# Patient Record
Sex: FEMALE | Race: WHITE | NOT HISPANIC OR LATINO | Employment: OTHER | URBAN - METROPOLITAN AREA
[De-identification: names, ages, dates, MRNs, and addresses within clinical notes are randomized per-mention and may not be internally consistent; named-entity substitution may affect disease eponyms.]

---

## 2017-08-16 ENCOUNTER — HOSPITAL ENCOUNTER (OUTPATIENT)
Dept: CARDIOLOGY | Facility: HOSPITAL | Age: 47
Discharge: HOME | End: 2017-08-16
Attending: INTERNAL MEDICINE | Admitting: INTERNAL MEDICINE

## 2017-08-16 ENCOUNTER — OFFICE VISIT (OUTPATIENT)
Dept: CARDIOLOGY | Facility: CLINIC | Age: 47
End: 2017-08-16

## 2017-08-16 VITALS
HEIGHT: 67 IN | WEIGHT: 162 LBS | HEART RATE: 58 BPM | DIASTOLIC BLOOD PRESSURE: 80 MMHG | SYSTOLIC BLOOD PRESSURE: 136 MMHG | BODY MASS INDEX: 25.43 KG/M2

## 2017-08-16 DIAGNOSIS — R07.1 CHEST PAIN ON BREATHING: ICD-10-CM

## 2017-08-16 DIAGNOSIS — I30.0 PERICARDITIS, ACUTE, IDIOPATHIC: Primary | ICD-10-CM

## 2017-08-16 LAB
ASCENDING AORTA: 2.6 CM
BH CV ECHO MEAS - ACS: 2.3 CM
BH CV ECHO MEAS - AO MAX PG (FULL): 0 MMHG
BH CV ECHO MEAS - AO MAX PG: 4.4 MMHG
BH CV ECHO MEAS - AO MEAN PG (FULL): 0.03 MMHG
BH CV ECHO MEAS - AO MEAN PG: 2.4 MMHG
BH CV ECHO MEAS - AO ROOT AREA (BSA CORRECTED): 1.7
BH CV ECHO MEAS - AO ROOT AREA: 8.2 CM^2
BH CV ECHO MEAS - AO ROOT DIAM: 3.2 CM
BH CV ECHO MEAS - AO V2 MAX: 104.7 CM/SEC
BH CV ECHO MEAS - AO V2 MEAN: 71.8 CM/SEC
BH CV ECHO MEAS - AO V2 VTI: 24 CM
BH CV ECHO MEAS - AVA(I,A): 3.4 CM^2
BH CV ECHO MEAS - AVA(I,D): 3.4 CM^2
BH CV ECHO MEAS - AVA(V,A): 3.4 CM^2
BH CV ECHO MEAS - AVA(V,D): 3.4 CM^2
BH CV ECHO MEAS - BSA(HAYCOCK): 1.9 M^2
BH CV ECHO MEAS - BSA: 1.8 M^2
BH CV ECHO MEAS - BZI_BMI: 25.4 KILOGRAMS/M^2
BH CV ECHO MEAS - BZI_METRIC_HEIGHT: 170.2 CM
BH CV ECHO MEAS - BZI_METRIC_WEIGHT: 73.5 KG
BH CV ECHO MEAS - CONTRAST EF (2CH): 56.5 ML/M^2
BH CV ECHO MEAS - CONTRAST EF 4CH: 65.3 ML/M^2
BH CV ECHO MEAS - EDV(CUBED): 116.1 ML
BH CV ECHO MEAS - EDV(MOD-SP2): 85 ML
BH CV ECHO MEAS - EDV(MOD-SP4): 95 ML
BH CV ECHO MEAS - EDV(TEICH): 111.6 ML
BH CV ECHO MEAS - EF(CUBED): 86 %
BH CV ECHO MEAS - EF(MOD-SP2): 56.5 %
BH CV ECHO MEAS - EF(MOD-SP4): 65.3 %
BH CV ECHO MEAS - EF(TEICH): 79.3 %
BH CV ECHO MEAS - ESV(MOD-SP2): 37 ML
BH CV ECHO MEAS - ESV(MOD-SP4): 33 ML
BH CV ECHO MEAS - ESV(TEICH): 23.1 ML
BH CV ECHO MEAS - FS: 48 %
BH CV ECHO MEAS - IVS/LVPW: 1.1
BH CV ECHO MEAS - IVSD: 0.92 CM
BH CV ECHO MEAS - LAT PEAK E' VEL: 16 CM/SEC
BH CV ECHO MEAS - LV DIASTOLIC VOL/BSA (35-75): 51.4 ML/M^2
BH CV ECHO MEAS - LV MASS(C)D: 148.2 GRAMS
BH CV ECHO MEAS - LV MASS(C)DI: 80.1 GRAMS/M^2
BH CV ECHO MEAS - LV MAX PG: 4.4 MMHG
BH CV ECHO MEAS - LV MEAN PG: 2.3 MMHG
BH CV ECHO MEAS - LV SYSTOLIC VOL/BSA (12-30): 17.8 ML/M^2
BH CV ECHO MEAS - LV V1 MAX: 104.8 CM/SEC
BH CV ECHO MEAS - LV V1 MEAN: 72.7 CM/SEC
BH CV ECHO MEAS - LV V1 VTI: 24.1 CM
BH CV ECHO MEAS - LVIDD: 4.9 CM
BH CV ECHO MEAS - LVIDS: 2.5 CM
BH CV ECHO MEAS - LVLD AP2: 7.7 CM
BH CV ECHO MEAS - LVLD AP4: 7.6 CM
BH CV ECHO MEAS - LVLS AP2: 6.6 CM
BH CV ECHO MEAS - LVLS AP4: 6.3 CM
BH CV ECHO MEAS - LVOT AREA (M): 3.5 CM^2
BH CV ECHO MEAS - LVOT AREA: 3.4 CM^2
BH CV ECHO MEAS - LVOT DIAM: 2.1 CM
BH CV ECHO MEAS - LVPWD: 0.85 CM
BH CV ECHO MEAS - MED PEAK E' VEL: 11 CM/SEC
BH CV ECHO MEAS - MV A MAX VEL: 42.2 CM/SEC
BH CV ECHO MEAS - MV E MAX VEL: 79.5 CM/SEC
BH CV ECHO MEAS - MV E/A: 1.9
BH CV ECHO MEAS - MV MAX PG: 3.5 MMHG
BH CV ECHO MEAS - MV MEAN PG: 1.1 MMHG
BH CV ECHO MEAS - MV V2 MAX: 93.9 CM/SEC
BH CV ECHO MEAS - MV V2 MEAN: 47.6 CM/SEC
BH CV ECHO MEAS - MV V2 VTI: 29 CM
BH CV ECHO MEAS - MVA(VTI): 2.8 CM^2
BH CV ECHO MEAS - PA ACC TIME: 0.16 SEC
BH CV ECHO MEAS - PA MAX PG (FULL): 3.2 MMHG
BH CV ECHO MEAS - PA MAX PG: 5.7 MMHG
BH CV ECHO MEAS - PA PR(ACCEL): 7.7 MMHG
BH CV ECHO MEAS - PA V2 MAX: 119.4 CM/SEC
BH CV ECHO MEAS - PULM A REVS DUR: 0.12 SEC
BH CV ECHO MEAS - PULM A REVS VEL: 25.7 CM/SEC
BH CV ECHO MEAS - PULM DIAS VEL: 43.2 CM/SEC
BH CV ECHO MEAS - PULM S/D: 1.2
BH CV ECHO MEAS - PULM SYS VEL: 52.9 CM/SEC
BH CV ECHO MEAS - PVA(V,A): 1.6 CM^2
BH CV ECHO MEAS - PVA(V,D): 1.6 CM^2
BH CV ECHO MEAS - QP/QS: 0.56
BH CV ECHO MEAS - RAP SYSTOLE: 3 MMHG
BH CV ECHO MEAS - RV MAX PG: 2.5 MMHG
BH CV ECHO MEAS - RV MEAN PG: 1.4 MMHG
BH CV ECHO MEAS - RV V1 MAX: 79.5 CM/SEC
BH CV ECHO MEAS - RV V1 MEAN: 55.3 CM/SEC
BH CV ECHO MEAS - RV V1 VTI: 19.3 CM
BH CV ECHO MEAS - RVOT AREA: 2.4 CM^2
BH CV ECHO MEAS - RVOT DIAM: 1.7 CM
BH CV ECHO MEAS - RVSP: 15 MMHG
BH CV ECHO MEAS - SI(AO): 106.2 ML/M^2
BH CV ECHO MEAS - SI(CUBED): 54 ML/M^2
BH CV ECHO MEAS - SI(LVOT): 44 ML/M^2
BH CV ECHO MEAS - SI(MOD-SP2): 26 ML/M^2
BH CV ECHO MEAS - SI(MOD-SP4): 33.5 ML/M^2
BH CV ECHO MEAS - SI(TEICH): 47.9 ML/M^2
BH CV ECHO MEAS - SUP REN AO DIAM: 1.9 CM
BH CV ECHO MEAS - SV(AO): 196.3 ML
BH CV ECHO MEAS - SV(CUBED): 99.8 ML
BH CV ECHO MEAS - SV(LVOT): 81.3 ML
BH CV ECHO MEAS - SV(MOD-SP2): 48 ML
BH CV ECHO MEAS - SV(MOD-SP4): 62 ML
BH CV ECHO MEAS - SV(RVOT): 45.4 ML
BH CV ECHO MEAS - SV(TEICH): 88.5 ML
BH CV ECHO MEAS - TAPSE (>1.6): 2.4 CM2
BH CV ECHO MEAS - TR MAX VEL: 169.7 CM/SEC
BH CV XLRA - RV BASE: 2.7 CM
BH CV XLRA - TDI S': 12 CM/SEC
E/E' RATIO: 5
LEFT ATRIUM VOLUME INDEX: 21 ML/M2
SINUS: 2.8 CM
STJ: 2.4 CM

## 2017-08-16 PROCEDURE — 99203 OFFICE O/P NEW LOW 30 MIN: CPT | Performed by: INTERNAL MEDICINE

## 2017-08-16 PROCEDURE — 93000 ELECTROCARDIOGRAM COMPLETE: CPT | Performed by: INTERNAL MEDICINE

## 2017-08-16 PROCEDURE — 93306 TTE W/DOPPLER COMPLETE: CPT | Performed by: INTERNAL MEDICINE

## 2017-08-16 PROCEDURE — 93306 TTE W/DOPPLER COMPLETE: CPT

## 2017-08-16 NOTE — PROGRESS NOTES
Date of Office Visit: 17  Encounter Provider: Bernard Meeks MD  Place of Service: [facility] CARDIOLOGY  Patient Name: Mahnaz Topete  :1970      Chief Complaint   Patient presents with   • Chest Pain     History of Present Illness  HPI Comments: Mrs Topete is a pleasant 46 yo lady who now presents for evaluation of chest pain.  About 3 weeks ago she had an episode of chest discomfort as if someone was ripping her heart out it lasted all day long but would really only occur with deep breath.  It was also however was worse when she would lie down and better sitting up.  And again it would occur pre-much that cold day and then ultimately resolved.  Of note about 3-4 weeks prior to that she did have an upper respiratory infection that was probably viral.  Since that episode chest pain 3 weeks ago she's had no further pain.  She typically denies any exertional pain or pressure denies any shortness of breath, no history of rheumatic fever, heart attack, heart failure.  No fever chills or sweats.  No arthralgias.    Chest Pain    Pertinent negatives include no abdominal pain, back pain, diaphoresis, dizziness, fever, headaches, malaise/fatigue, nausea, numbness, vomiting or weakness.   Pertinent negatives for past medical history include no muscle weakness.         Past Medical History:   Diagnosis Date   • Chest pain    • Depression    • Hypotension    • Lower back pain    • Lumbar strain    • Neck sprain    • Pancreatitis    • Throat pain          Past Surgical History:   Procedure Laterality Date   • DILATATION AND CURETTAGE     • MANDIBLE FRACTURE SURGERY      wired as a child (right)   • TUBAL ABDOMINAL LIGATION               Current Outpatient Prescriptions on File Prior to Visit   Medication Sig Dispense Refill   • [DISCONTINUED] fluticasone (FLONASE) 50 MCG/ACT nasal spray 1 spray into each nostril.     • [DISCONTINUED] HYDROcodone-acetaminophen (NORCO) 5-325 MG  per tablet Take  by mouth.     • [DISCONTINUED] metaxalone (SKELAXIN) 800 MG tablet Take  by mouth 3 (Three) Times a Day.     • [DISCONTINUED] naproxen (NAPROSYN) 500 MG tablet Take  by mouth Every 12 (Twelve) Hours.     • [DISCONTINUED] predniSONE (DELTASONE) 20 MG tablet Take  by mouth Daily.       No current facility-administered medications on file prior to visit.          Social History     Social History   • Marital status:      Spouse name: N/A   • Number of children: N/A   • Years of education: N/A     Occupational History   • Not on file.     Social History Main Topics   • Smoking status: Former Smoker     Packs/day: 0.50     Years: 30.00     Types: Cigarettes     Quit date: 7/29/2015   • Smokeless tobacco: Not on file   • Alcohol use No   • Drug use: No   • Sexual activity: Defer     Other Topics Concern   • Not on file     Social History Narrative       Family History   Problem Relation Age of Onset   • Diabetes Mother    • Hypertension Mother    • Migraines Mother    • Hypertension Other    • Stroke Other          Review of Systems   Constitution: Negative for decreased appetite, diaphoresis, fever, weakness, malaise/fatigue, weight gain and weight loss.   HENT: Negative for congestion, headaches, hearing loss, nosebleeds and tinnitus.    Eyes: Negative for blurred vision, double vision, vision loss in left eye, vision loss in right eye and visual disturbance.   Cardiovascular:        As noted in HPI   Respiratory:        As noted HPI   Endocrine: Negative for cold intolerance and heat intolerance.   Hematologic/Lymphatic: Negative for bleeding problem. Does not bruise/bleed easily.   Skin: Negative for color change, flushing, itching and rash.   Musculoskeletal: Negative for arthritis, back pain, joint pain, joint swelling, muscle weakness and myalgias.   Gastrointestinal: Negative for bloating, abdominal pain, constipation, diarrhea, dysphagia, heartburn, hematemesis, hematochezia, melena,  "nausea and vomiting.   Genitourinary: Negative for bladder incontinence, dysuria, frequency, nocturia and urgency.   Neurological: Negative for dizziness, focal weakness, light-headedness, loss of balance, numbness, paresthesias and vertigo.   Psychiatric/Behavioral: Negative for depression, memory loss and substance abuse.       Procedures      ECG 12 Lead  Date/Time: 8/16/2017 10:03 AM  Performed by: VANGIE ECHEVERRIA  Authorized by: VANGIE ECHEVERRIA   Comparison: compared with previous ECG   Similar to previous ECG  Rhythm: sinus rhythm  Rate: normal  QRS axis: normal                 Objective:    /80 (BP Location: Left arm)  Pulse 58  Ht 67\" (170.2 cm)  Wt 162 lb (73.5 kg)  BMI 25.37 kg/m2       Physical Exam  Physical Exam   Constitutional: She is oriented to person, place, and time. She appears well-developed and well-nourished. No distress.   HENT:   Head: Normocephalic.   Eyes: Conjunctivae are normal. Pupils are equal, round, and reactive to light. No scleral icterus.   Neck: Normal carotid pulses, no hepatojugular reflux and no JVD present. Carotid bruit is not present. No tracheal deviation, no edema and no erythema present. No thyromegaly present.   Cardiovascular: Normal rate, regular rhythm, S1 normal, S2 normal, normal heart sounds and intact distal pulses.   No extrasystoles are present. PMI is not displaced.  Exam reveals no gallop, no distant heart sounds and no friction rub.    No murmur heard.  Pulses:       Carotid pulses are 2+ on the right side, and 2+ on the left side.       Radial pulses are 2+ on the right side, and 2+ on the left side.        Femoral pulses are 2+ on the right side, and 2+ on the left side.       Dorsalis pedis pulses are 2+ on the right side, and 2+ on the left side.        Posterior tibial pulses are 2+ on the right side, and 2+ on the left side.   Pulmonary/Chest: Effort normal and breath sounds normal. No respiratory distress. She has no decreased breath " sounds. She has no wheezes. She has no rhonchi. She has no rales. She exhibits no tenderness.   Abdominal: Soft. Bowel sounds are normal. She exhibits no distension and no mass. There is no hepatosplenomegaly. There is no tenderness. There is no rebound and no guarding.   Musculoskeletal: She exhibits no edema, tenderness or deformity.   Neurological: She is alert and oriented to person, place, and time.   Skin: Skin is warm and dry. No rash noted. She is not diaphoretic. No cyanosis or erythema. No pallor. Nails show no clubbing.   Psychiatric: She has a normal mood and affect. Her speech is normal and behavior is normal. Judgment and thought content normal.           Assessment:   1.  47-year-old female with chest pain certainly sounds like pericarditis.  However that was 3 weeks ago is now completely resolved.  We are going to check an echocardiogram on her if that's unremarkable no further cardiovascular evaluation or treatment needed.  In addition she has recurrent episodes may need to consider adding colchicine.         Plan:

## 2024-12-23 ENCOUNTER — TELEPHONE (OUTPATIENT)
Age: 54
End: 2024-12-23
Payer: COMMERCIAL

## 2024-12-23 RX ORDER — CHOLECALCIFEROL (VITAMIN D3) 50 MCG
1 TABLET ORAL DAILY
Qty: 90 TABLET | Refills: 3 | Status: SHIPPED | OUTPATIENT
Start: 2024-12-23

## 2024-12-23 RX ORDER — CHOLECALCIFEROL (VITAMIN D3) 50 MCG
1 TABLET ORAL DAILY
COMMUNITY
End: 2024-12-23 | Stop reason: SDUPTHER

## 2024-12-30 ENCOUNTER — OFFICE VISIT (OUTPATIENT)
Age: 54
End: 2024-12-30
Payer: COMMERCIAL

## 2024-12-30 VITALS
DIASTOLIC BLOOD PRESSURE: 70 MMHG | HEIGHT: 67 IN | TEMPERATURE: 97 F | RESPIRATION RATE: 14 BRPM | SYSTOLIC BLOOD PRESSURE: 122 MMHG | OXYGEN SATURATION: 98 % | BODY MASS INDEX: 22.44 KG/M2 | HEART RATE: 62 BPM | WEIGHT: 143 LBS

## 2024-12-30 DIAGNOSIS — Z12.31 SCREENING MAMMOGRAM FOR BREAST CANCER: ICD-10-CM

## 2024-12-30 DIAGNOSIS — G89.29 CHRONIC PAIN OF RIGHT KNEE: ICD-10-CM

## 2024-12-30 DIAGNOSIS — Z00.00 ENCOUNTER FOR ROUTINE ADULT HEALTH EXAMINATION WITHOUT ABNORMAL FINDINGS: Primary | ICD-10-CM

## 2024-12-30 DIAGNOSIS — B35.1 ONYCHOMYCOSIS: ICD-10-CM

## 2024-12-30 DIAGNOSIS — Z13.1 SCREENING FOR DIABETES MELLITUS: ICD-10-CM

## 2024-12-30 DIAGNOSIS — M79.651 RIGHT THIGH PAIN: ICD-10-CM

## 2024-12-30 DIAGNOSIS — Z12.11 SCREENING FOR MALIGNANT NEOPLASM OF COLON: ICD-10-CM

## 2024-12-30 DIAGNOSIS — M25.561 CHRONIC PAIN OF RIGHT KNEE: ICD-10-CM

## 2024-12-30 DIAGNOSIS — S93.491A SPRAIN OF ANTERIOR TALOFIBULAR LIGAMENT OF RIGHT ANKLE, INITIAL ENCOUNTER: ICD-10-CM

## 2024-12-30 DIAGNOSIS — Z12.11 ENCOUNTER FOR SCREENING COLONOSCOPY: ICD-10-CM

## 2024-12-30 PROBLEM — Z82.49 FAMILY HISTORY OF ARTERIAL ANEURYSM: Status: ACTIVE | Noted: 2019-02-19

## 2024-12-30 PROCEDURE — 99386 PREV VISIT NEW AGE 40-64: CPT | Performed by: FAMILY MEDICINE

## 2024-12-30 PROCEDURE — 1159F MED LIST DOCD IN RCRD: CPT | Performed by: FAMILY MEDICINE

## 2024-12-30 PROCEDURE — 1126F AMNT PAIN NOTED NONE PRSNT: CPT | Performed by: FAMILY MEDICINE

## 2024-12-30 PROCEDURE — 1160F RVW MEDS BY RX/DR IN RCRD: CPT | Performed by: FAMILY MEDICINE

## 2024-12-30 RX ORDER — TERBINAFINE HYDROCHLORIDE 250 MG/1
250 TABLET ORAL DAILY
Qty: 84 TABLET | Refills: 0 | Status: SHIPPED | OUTPATIENT
Start: 2024-12-30

## 2024-12-30 NOTE — PROGRESS NOTES
Chief Complaint  Annual Exam    Subjective        Mahnaz Topete presents to [facility]  History of Present Illness    History of Present Illness  The patient is a 54-year-old female who presents for evaluation of right ankle pain, knee pain, right thigh pain, and bilateral toe fungus.    She reports a history of recurrent right ankle sprains, with an estimated 25 incidents to date. Over the past year, she has experienced intermittent shooting pain in the same ankle, which is not a daily occurrence but is triggered by certain movements. The onset of this pain coincided with a visit to her daughter in [place] approximately a year ago. She also reports chronic right ankle pain that began 3 to 4 months ago, which she rates as a 6 or 7 on a scale of 1 to 10. This pain is not constant but occurs in episodes lasting 20 to 30 minutes. She finds relief from the pain by resting the ankle. Her occupation involves ground-level work in tree service, which does not require heavy lifting or climbing. She recalls wearing leg braces as an infant, as reported by her mother.    She describes an incident last week where she fell asleep on her back, despite being a habitual side sleeper, and woke up 3 to 4 hours later unable to move her leg due to severe pain. She noticed an unusual appearance of her knee after showering yesterday, which has since improved. The pain in her knee is similar to that in her ankle, both in nature and in the relief provided by rest. She speculates that these symptoms may be related to her history of ankle sprains.    She has been experiencing intermittent right thigh pain for the past year, which she manages by resting. She reports no specific triggers for this pain and states that it does not interfere with her daily activities.    She reports the presence of fungal infection in her bilateral toes, characterized by yellow discoloration and darkening. She has attempted various  "home remedies without success. She does not report any impact on her ability to perform her job duties.    SOCIAL HISTORY  She works in tree service.    FAMILY HISTORY  She has a family history of aneurysms on both her maternal and paternal sides.      Objective   Vital Signs:  /70 (BP Location: Right arm, Patient Position: Sitting, Cuff Size: Adult)   Pulse 62   Temp 97 °F (36.1 °C) (Temporal)   Resp 14   Ht 170.2 cm (67\")   Wt 64.9 kg (143 lb)   SpO2 98%   BMI 22.40 kg/m²   Estimated body mass index is 22.4 kg/m² as calculated from the following:    Height as of this encounter: 170.2 cm (67\").    Weight as of this encounter: 64.9 kg (143 lb).    BMI is within normal parameters. No other follow-up for BMI required.        Physical Exam  Constitutional:       General: She is not in acute distress.     Appearance: Normal appearance. She is not ill-appearing, toxic-appearing or diaphoretic.   HENT:      Head: Normocephalic and atraumatic.      Right Ear: There is no impacted cerumen.      Left Ear: There is no impacted cerumen.      Nose: No congestion or rhinorrhea.      Mouth/Throat:      Pharynx: Oropharynx is clear. No oropharyngeal exudate or posterior oropharyngeal erythema.   Eyes:      General: No scleral icterus.        Right eye: No discharge.         Left eye: No discharge.      Extraocular Movements: Extraocular movements intact.      Conjunctiva/sclera: Conjunctivae normal.      Pupils: Pupils are equal, round, and reactive to light.   Cardiovascular:      Rate and Rhythm: Normal rate and regular rhythm.      Pulses: Normal pulses.      Heart sounds: Normal heart sounds.   Pulmonary:      Effort: Pulmonary effort is normal.      Breath sounds: Normal breath sounds.   Abdominal:      General: Abdomen is flat. Bowel sounds are normal.      Palpations: Abdomen is soft.   Musculoskeletal:         General: Normal range of motion.      Cervical back: Normal range of motion and neck supple. "   Skin:     General: Skin is warm.      Comments:  Onychomycosis bilateral great toes   Neurological:      General: No focal deficit present.      Mental Status: She is alert and oriented to person, place, and time. Mental status is at baseline.   Psychiatric:         Mood and Affect: Mood normal.         Behavior: Behavior normal.         Thought Content: Thought content normal.         Judgment: Judgment normal.             Lungs were auscultated.  Knee exam is completely normal on both sides.         Result Review :           Results               Patient Counseling:  --Nutrition: Stressed importance of moderation in sodium/caffeine intake, saturated fat and cholesterol, caloric balance, sufficient intake of fresh fruits, vegetables, fiber, calcium, iron, and 1 mg of folate supplement per day (for females capable of pregnancy).  --Exercise: Stressed the importance of regular exercise.   --Substance Abuse: Discussed cessation/primary prevention of tobacco, alcohol, or other drug use; driving or other dangerous activities under the influence; availability of treatment for abuse.    --Sexuality: Discussed sexually transmitted diseases, partner selection, use of condoms, avoidance of unintended pregnancy  and contraceptive alternatives.   --Injury prevention: Discussed safety belts, safety helmets, smoke detector, smoking near bedding or upholstery.   --Dental health: Discussed importance of regular tooth brushing, flossing, and dental visits.  --Immunizations reviewed.  --Discussed benefits of screening colonoscopy.  --After hours service discussed with patient       Assessment and Plan   Diagnoses and all orders for this visit:    1. Encounter for routine adult health examination without abnormal findings (Primary)  -     Basic Metabolic Panel  -     CBC & Differential    2. Encounter for screening colonoscopy  -     Hepatic Function Panel (6) ([facility])  -     Lipid Panel    3. Screening for diabetes mellitus  -      Hemoglobin A1c    4. Screening for malignant neoplasm of colon  -     Cologuard - Stool, Per Rectum; Future    5. Screening mammogram for breast cancer  -     Mammo Screening Digital Tomosynthesis Bilateral With CAD; Future    6. Chronic pain of right knee    7. Right thigh pain    8. Sprain of anterior talofibular ligament of right ankle, initial encounter    9. Onychomycosis  -     terbinafine (lamiSIL) 250 MG tablet; Take 1 tablet by mouth Daily.  Dispense: 84 tablet; Refill: 0        Assessment & Plan  1. Right ankle pain.  The patient reports chronic right ankle pain, which started within the last three to four months and is rated at a 6-7/10 in severity when it occurs. The pain is intermittent and lasts about 20-30 minutes. It is suspected to be due to an anterior fibular ligament rupture. No treatment is recommended at this point unless the condition becomes more chronic.    2. Knee pain.  The patient reports knee pain that started within the last year or two, with a recent episode of severe pain after sleeping on her back. The knee exam is completely normal on both sides. No treatment is recommended at this point unless the condition becomes more chronic.    3. Right thigh pain.  The patient reports right thigh pain that started a year ago and is intermittent. The pain does not impact her activities of daily living. No treatment is recommended at this point unless the condition becomes more chronic.    4. Bilateral onychomycosis.  The patient reports bilateral toe fungus that has not improved with home remedies. A medication regimen for 84 days is discussed and will be prescribed.    5. Health maintenance.  A kit for colon cancer screening will be sent to the patient. A mammogram has been ordered.            Follow Up   Return if symptoms worsen or fail to improve.  Patient was given instructions and counseling regarding her condition or for health maintenance advice. Please see specific information pulled  into the AVS if appropriate.         Patient or patient representative verbalized consent for the use of Ambient Listening during the visit with  Ty Estrada MD for chart documentation. 12/30/2024  08:18 EST

## 2024-12-31 ENCOUNTER — HOSPITAL ENCOUNTER (EMERGENCY)
Facility: HOSPITAL | Age: 54
Discharge: HOME | End: 2024-12-31
Attending: EMERGENCY MEDICINE | Admitting: EMERGENCY MEDICINE
Payer: COMMERCIAL

## 2024-12-31 VITALS
HEART RATE: 65 BPM | TEMPERATURE: 97.6 F | OXYGEN SATURATION: 100 % | DIASTOLIC BLOOD PRESSURE: 73 MMHG | SYSTOLIC BLOOD PRESSURE: 112 MMHG | RESPIRATION RATE: 16 BRPM

## 2024-12-31 DIAGNOSIS — R22.0 FACIAL SWELLING: Primary | ICD-10-CM

## 2024-12-31 DIAGNOSIS — U07.1 COVID-19: ICD-10-CM

## 2024-12-31 DIAGNOSIS — J06.9 VIRAL URI: ICD-10-CM

## 2024-12-31 DIAGNOSIS — B34.8 RHINOVIRUS INFECTION: ICD-10-CM

## 2024-12-31 LAB
ALBUMIN SERPL-MCNC: 3.9 G/DL (ref 3.8–4.9)
ALP SERPL-CCNC: 81 IU/L (ref 44–121)
ALT SERPL-CCNC: 11 IU/L (ref 0–32)
AST SERPL-CCNC: 15 IU/L (ref 0–40)
B PARAPERT DNA SPEC QL NAA+PROBE: NOT DETECTED
B PERT DNA SPEC QL NAA+PROBE: NOT DETECTED
BASOPHILS # BLD AUTO: 0.1 X10E3/UL (ref 0–0.2)
BASOPHILS NFR BLD AUTO: 1 %
BILIRUB DIRECT SERPL-MCNC: 0.27 MG/DL (ref 0–0.4)
BILIRUB SERPL-MCNC: 0.7 MG/DL (ref 0–1.2)
BUN SERPL-MCNC: 9 MG/DL (ref 6–24)
BUN/CREAT SERPL: 15 (ref 9–23)
C PNEUM DNA NPH QL NAA+NON-PROBE: NOT DETECTED
CALCIUM SERPL-MCNC: 9.2 MG/DL (ref 8.7–10.2)
CHLORIDE SERPL-SCNC: 103 MMOL/L (ref 96–106)
CHOLEST SERPL-MCNC: 139 MG/DL (ref 100–199)
CO2 SERPL-SCNC: 21 MMOL/L (ref 20–29)
CREAT SERPL-MCNC: 0.6 MG/DL (ref 0.57–1)
EGFRCR SERPLBLD CKD-EPI 2021: 108 ML/MIN/1.73
EOSINOPHIL # BLD AUTO: 0.2 X10E3/UL (ref 0–0.4)
EOSINOPHIL NFR BLD AUTO: 3 %
ERYTHROCYTE [DISTWIDTH] IN BLOOD BY AUTOMATED COUNT: 12.1 % (ref 11.7–15.4)
FLUAV SUBTYP SPEC NAA+PROBE: NOT DETECTED
FLUBV RNA ISLT QL NAA+PROBE: NOT DETECTED
GLUCOSE SERPL-MCNC: 92 MG/DL (ref 70–99)
HADV DNA SPEC NAA+PROBE: NOT DETECTED
HBA1C MFR BLD: 5.2 % (ref 4.8–5.6)
HCOV 229E RNA SPEC QL NAA+PROBE: NOT DETECTED
HCOV HKU1 RNA SPEC QL NAA+PROBE: NOT DETECTED
HCOV NL63 RNA SPEC QL NAA+PROBE: NOT DETECTED
HCOV OC43 RNA SPEC QL NAA+PROBE: NOT DETECTED
HCT VFR BLD AUTO: 38.4 % (ref 34–46.6)
HDLC SERPL-MCNC: 48 MG/DL
HGB BLD-MCNC: 12.6 G/DL (ref 11.1–15.9)
HMPV RNA NPH QL NAA+NON-PROBE: NOT DETECTED
HPIV1 RNA ISLT QL NAA+PROBE: NOT DETECTED
HPIV2 RNA SPEC QL NAA+PROBE: NOT DETECTED
HPIV3 RNA NPH QL NAA+PROBE: NOT DETECTED
HPIV4 P GENE NPH QL NAA+PROBE: NOT DETECTED
IMM GRANULOCYTES # BLD AUTO: 0 X10E3/UL (ref 0–0.1)
IMM GRANULOCYTES NFR BLD AUTO: 0 %
LDLC SERPL CALC-MCNC: 75 MG/DL (ref 0–99)
LYMPHOCYTES # BLD AUTO: 1 X10E3/UL (ref 0.7–3.1)
LYMPHOCYTES NFR BLD AUTO: 15 %
M PNEUMO IGG SER IA-ACNC: NOT DETECTED
MCH RBC QN AUTO: 30.6 PG (ref 26.6–33)
MCHC RBC AUTO-ENTMCNC: 32.8 G/DL (ref 31.5–35.7)
MCV RBC AUTO: 93 FL (ref 79–97)
MONOCYTES # BLD AUTO: 0.7 X10E3/UL (ref 0.1–0.9)
MONOCYTES NFR BLD AUTO: 10 %
NEUTROPHILS # BLD AUTO: 4.8 X10E3/UL (ref 1.4–7)
NEUTROPHILS NFR BLD AUTO: 71 %
PLATELET # BLD AUTO: 278 X10E3/UL (ref 150–450)
POTASSIUM SERPL-SCNC: 4.2 MMOL/L (ref 3.5–5.2)
RBC # BLD AUTO: 4.12 X10E6/UL (ref 3.77–5.28)
RHINOVIRUS RNA SPEC NAA+PROBE: DETECTED
RSV RNA NPH QL NAA+NON-PROBE: NOT DETECTED
SARS-COV-2 RNA NPH QL NAA+NON-PROBE: DETECTED
SODIUM SERPL-SCNC: 136 MMOL/L (ref 134–144)
TRIGL SERPL-MCNC: 83 MG/DL (ref 0–149)
VLDLC SERPL CALC-MCNC: 16 MG/DL (ref 5–40)
WBC # BLD AUTO: 6.7 X10E3/UL (ref 3.4–10.8)

## 2024-12-31 PROCEDURE — 25010000002 DIPHENHYDRAMINE PER 50 MG: Performed by: EMERGENCY MEDICINE

## 2024-12-31 PROCEDURE — 96372 THER/PROPH/DIAG INJ SC/IM: CPT

## 2024-12-31 PROCEDURE — 0202U NFCT DS 22 TRGT SARS-COV-2: CPT | Performed by: EMERGENCY MEDICINE

## 2024-12-31 PROCEDURE — 96374 THER/PROPH/DIAG INJ IV PUSH: CPT

## 2024-12-31 PROCEDURE — 99283 EMERGENCY DEPT VISIT LOW MDM: CPT

## 2024-12-31 RX ORDER — DIPHENHYDRAMINE HYDROCHLORIDE 50 MG/ML
25 INJECTION INTRAMUSCULAR; INTRAVENOUS ONCE
Status: COMPLETED | OUTPATIENT
Start: 2024-12-31 | End: 2024-12-31

## 2024-12-31 RX ORDER — SODIUM CHLORIDE 0.9 % (FLUSH) 0.9 %
10 SYRINGE (ML) INJECTION AS NEEDED
Status: DISCONTINUED | OUTPATIENT
Start: 2024-12-31 | End: 2024-12-31 | Stop reason: HOSPADM

## 2024-12-31 RX ADMIN — DIPHENHYDRAMINE HYDROCHLORIDE 25 MG: 50 INJECTION, SOLUTION INTRAMUSCULAR; INTRAVENOUS at 16:06

## 2024-12-31 NOTE — ED PROVIDER NOTES
EMERGENCY DEPARTMENT ENCOUNTER    Room Number:  AISHA/AISHA  Date of encounter:  1/3/2025  PCP: Ty Estrada MD  Historian: Patient and daughter  Relevant information and history provided by sources other than the patient will be included below and in the ED Course.  Review of pertinent past medical records may also be included in record below and ED Course.    HPI:  Chief Complaint: Swelling to left side of face  A complete HPI/ROS/PMH/PSH/SH/FH are unobtainable due to: Not applicable  Context: Mahnaz Topete is a 54 y.o. female who presents to the ED c/o patient was driving in a car about 1 hour prior to arrival.  She all of a sudden felt swelling to the left side of her face.  She then looked in the mirror and noticed swelling in the to the left side of her face.  She communicated with her spouse as well.  She had altered sensation in the left side of her face where the swelling was basically was the angle of the jaw up to about the level of her eye and temporal region.  It was swollen she could feel that it was swollen with her tongue she visualized the swelling.  She had an altered sensation there as well.  She has been suffering from an upper respiratory tract infection.  This has been going on for a couple weeks.  Her spouse tested positive for COVID.  She tested negative for COVID.  Overall this upper respiratory infection is improving.  She still is suffering from nasal and sinus congestion.  She has had a mild cough but that is improved.  No shortness of breath or chest pain.  No headache.  No vision change no speech change.  No swelling to her tongue or airway and no shortness of breath.  She has no weakness in her arms or legs.  She has a history of anxiety and she states that she was hyperventilating and had some tingling in her fingers which is subsequently resolved.  She is concerned because there is a family history of aneurysms in her grandmother and mother and she was concerned that what she was  having was an aneurysm issue.  Is important to note that she had no neurologic issue no vision change and no headache.  She did take 2 Motrin about 2 hours prior to the symptoms.  She has had Motrin without any problems in the past.  There is no new medicines no new detergents no new foods.  The swelling has improved but not resolved when she arrives here.  Patient's daughter is at bedside and appreciated the swelling and again states the swelling is still present but is improved from initial presentation.  She is feeling better.  The tingling in her fingers have resolved as well when she stopped hyperventilating.        Previous Episodes: No  Current Symptoms: See above    MEDICAL HISTORY REVIEWED  History of anxiety      PAST MEDICAL HISTORY  Active Ambulatory Problems     Diagnosis Date Noted    Family history of arterial aneurysm 02/19/2019     Resolved Ambulatory Problems     Diagnosis Date Noted    Acute pancreatitis 12/11/2016     Past Medical History:   Diagnosis Date    Chest pain     Depression     Hypotension     Lower back pain     Lumbar strain     Neck sprain     Pancreatitis     Throat pain          PAST SURGICAL HISTORY  Past Surgical History:   Procedure Laterality Date    DILATATION AND CURETTAGE      MANDIBLE FRACTURE SURGERY      wired as a child (right)    TUBAL ABDOMINAL LIGATION      2000         FAMILY HISTORY  Family History   Problem Relation Age of Onset    Diabetes Mother     Hypertension Mother     Migraines Mother     COPD Mother     Coronary artery disease Father     Other (chronic hepatitis c) Sister     Diabetes Maternal Grandfather     Hypertension Other     Stroke Other     Aneurysm Other          SOCIAL HISTORY  Social History     Socioeconomic History    Marital status:    Tobacco Use    Smoking status: Former     Current packs/day: 0.00     Average packs/day: 0.5 packs/day for 30.0 years (15.0 ttl pk-yrs)     Types: Cigarettes     Start date: 7/29/1985     Quit date:  2015     Years since quittin.4    Smokeless tobacco: Never   Vaping Use    Vaping status: Never Used   Substance and Sexual Activity    Alcohol use: No    Drug use: No    Sexual activity: Defer         ALLERGIES  No known drug allergy        REVIEW OF SYSTEMS  Review of Systems     All systems reviewed and negative except for those discussed in HPI.       PHYSICAL EXAM    I have reviewed the triage vital signs and nursing notes.    ED Triage Vitals   Temp Heart Rate Resp BP SpO2   24 1443 24 1443 24 1443 24 1445 24 1443   97.6 °F (36.4 °C) 106 16 152/97 100 %      Temp src Heart Rate Source Patient Position BP Location FiO2 (%)   -- -- -- -- --              GENERAL: Anxious female.  No acute distress.Vital signs on my initial evaluation O2 sats 100% on room air.  A little hypertensive.  Heart rate is normal on my exam she is afebrile  HENT: nares patent  On my exam when I look at her head she has mild swelling to her left maxillary region it is above the angle of the left jaw it is below the left zygomatic arch.  The swelling is minimal.  Daughter and patient believe that is present and is improved from initial presentation.  There is no erythema.  There is no itching.  EYES: no scleral icterus, no conjunctival pallor.  Pupils equal round reactive to light extraocular muscles intact.  No diplopia.  No visual field deficit.  No vision change  NECK: Supple, no meningismus  CV: regular rhythm, regular rate with intact distal pulses.  RESPIRATORY: normal effort and no respiratory distress.  To auscultation bilaterally  ABDOMEN: soft and nontender.  MUSCULOSKELETAL: no deformity.  Intact distal pulses that are equal strong and symmetric.  No coolness or cyanosis.  NEURO: alert and appropriate, moves all extremities, follows commands.  Recent and remote memory functions are normal. The patient is attentive with normal concentration. Language is fluent. Speech is clear. The speech is  non-dysarthric. Fund of knowledge is normal.   Symmetric smile with no facial droop.  Eyes close shut strongly bilaterally.  Symmetric eyebrow raise bilaterally.  EOMI, PERRL  CN II-XII grossly normal otherwise.  5/5 strength to extremities.No sensory changes to face or extremities. No focal weakness.  No pronator drift.  Intact FNF. Normal gait  No meningismus.     SKIN: warm, dry    Vital signs and nursing notes reviewed.        LAB RESULTS  No results found for this or any previous visit (from the past 24 hours).    Ordered the above labs and independently reviewed the results.        RADIOLOGY  No Radiology Exams Resulted Within Past 24 Hours    I ordered the above noted radiological studies. Reviewed by me and discussed with radiologist.  See dictation for official radiology interpretation.      PROCEDURES    Procedures      MEDICATIONS GIVEN IN ER    Medications   diphenhydrAMINE (BENADRYL) injection 25 mg (25 mg Intravenous Given 12/31/24 1606)         All labs have been independently reviewed by me.  All radiology studies have been reviewed by me and I discussed with radiologist dictating the report when indicated below.  All EKG's independently viewed and interpreted by me.  Discussion below represents my analysis of pertinent findings related to patient's condition, differential diagnosis, treatment plan and final disposition.        PROGRESS, DATA ANALYSIS, CONSULTS, AND MEDICAL DECISION MAKING    I really anticipate what she has is some sort of potential allergic reaction.  Given her recent viral URI which she is getting better on that very well could be the trigger to why she has swelling to the left side of her face.  She has no itching anywhere.  No other swelling or rash.  It is doing better by itself.  She took Motrin 2 hours prior to arrival here but she has never had problems with Motrin in the past.  I assured her this is not from any aneurysm.  She has no neurologic deficit she has no headache  and aneurysms do not cause swelling to the left side of her face specifically the cheek and jawline that fits her description and this occurrence.  She refuses any other treatment other than Benadryl.  Does not want steroids does not want Pepcid.  She agrees to get a viral respiratory panel.  Her symptoms of URI are improving.  All questions answered at this time.  Talk with the patient and daughter at bedside.      ED Course as of 01/03/25 1945   Tue Dec 31, 2024   1522 Patient has had Benadryl before and agrees to take Benadryl.  She does not want Pepcid she does not want steroids and does not want any other medicines. [MM]   1622 Patient's other daughter arrived.  I went back and talked with both daughters and patient again at length informed of my clinical concerns.  She has had some improvement of the swelling from site for RANJIT.  It is credibly mild at this time.  All questions answered at this time. [MM]   1632 Dr. Andrews is can a follow-up with a viral respiratory panel and inform the patient of the results. [MM]   1738 I discussed viral swab results with patient.  She is well appearing with no obvious facial swelling or asymmetry at all.  Patient plan to rest at home and consider Zyrtec as recommended. [AR]      ED Course User Index  [AR] Bria Lion MD  [MM] Zia Red MD       AS OF 19:45 EST VITALS:    BP - 112/73  HR - 65  TEMP - 97.6 °F (36.4 °C)  02 SATS - 100%    SOCIAL DETERMINANTS OF HEALTH THAT IMPACT OR LIMIT CARE (For example..Homelessness,safe discharge, inability to obtain care, follow up, or prescriptions):      DIAGNOSIS  Final diagnoses:   Facial swelling: Transient left maxillary region   Viral URI   COVID-19   Rhinovirus infection         DISPOSITION  DISCHARGE    Patient discharged in stable condition.    Reviewed implications of results, diagnosis, meds, responsibility to follow up, warning signs and symptoms of possible worsening, potential complications and reasons to  return to ER, including worsening of symptoms, worsening of symptoms, shortness of breath, fever, chills, rash, any concerns..    Patient/Family voiced understanding of above instructions.    Discussed plan for discharge, as there is no emergent indication for admission. Pt/family is agreeable and understands need for follow up and repeat testing.  Pt is aware that discharge does not mean that nothing is wrong but it indicates no emergency is present that requires admission and they must continue care with follow-up as given below or physician of their choice.     FOLLOW-UP  Ty Estrada MD  [address]  [address]  [facility] [address]  [phone]    In 1 week  Return if you have any worsening of swelling, shortness of breath, swelling of tongue or throat, fever, any new pain any new weakness or numbness or tingling or any new vision change.         Medication List      No changes were made to your prescriptions during this visit.                 DICTATED UTILIZING DRAGON DICTATION    Note Disclaimer: At [facility], we believe that sharing information builds trust and better relationships. You are receiving this note because you recently visited [facility]. It is possible you will see health information before a provider has talked with you about it. This kind of information can be easy to misunderstand. To help you fully understand what it means for your health, we urge you to discuss this note with your provider.       Zia Red MD  01/03/25 1037

## 2024-12-31 NOTE — DISCHARGE INSTRUCTIONS
As we discussed take Zyrtec over-the-counter for any allergies.  Return if you have any worsening of symptoms or symptoms return.

## 2025-01-08 RX ORDER — INDOMETHACIN 50 MG/1
50 CAPSULE ORAL 3 TIMES DAILY PRN
COMMUNITY
End: 2025-01-08 | Stop reason: SDUPTHER

## 2025-01-08 RX ORDER — INDOMETHACIN 50 MG/1
50 CAPSULE ORAL 3 TIMES DAILY PRN
Qty: 10 CAPSULE | Refills: 0 | Status: SHIPPED | OUTPATIENT
Start: 2025-01-08

## 2025-01-22 ENCOUNTER — TELEPHONE (OUTPATIENT)
Age: 55
End: 2025-01-22
Payer: COMMERCIAL

## 2025-01-22 NOTE — TELEPHONE ENCOUNTER
Left message with to return my call regarding abnormal lab work.  (Dennis)      Pt needs to schedule an appt with GLEN or Dr. Estrada.

## 2025-01-27 ENCOUNTER — OFFICE VISIT (OUTPATIENT)
Age: 55
End: 2025-01-27
Payer: COMMERCIAL

## 2025-01-27 VITALS
BODY MASS INDEX: 22.85 KG/M2 | SYSTOLIC BLOOD PRESSURE: 110 MMHG | OXYGEN SATURATION: 98 % | HEIGHT: 67 IN | HEART RATE: 50 BPM | RESPIRATION RATE: 16 BRPM | DIASTOLIC BLOOD PRESSURE: 70 MMHG | WEIGHT: 145.6 LBS | TEMPERATURE: 97.2 F

## 2025-01-27 DIAGNOSIS — R19.5 POSITIVE COLORECTAL CANCER SCREENING USING COLOGUARD TEST: Primary | ICD-10-CM

## 2025-01-27 PROCEDURE — 1126F AMNT PAIN NOTED NONE PRSNT: CPT

## 2025-01-27 PROCEDURE — 99213 OFFICE O/P EST LOW 20 MIN: CPT

## 2025-01-31 NOTE — PROGRESS NOTES
"Chief Complaint  abnormal results    Subjective        Mahnaz Topete presents to [facility]  History of Present Illness    History of Present Illness  The patient is a 54-year-old female who presents for follow-up on her Cologuard test.    She reports an abnormal result from her Cologuard test, which necessitates a colonoscopy. She has expressed a desire to have the procedure performed by Dr. Mcduffie at [facility]. She has no reported issues with bowel movements and maintains regularity.    She also mentions that blood work was conducted during her last examination, but she has not received the results yet.       Objective   Vital Signs:  /70 (BP Location: Right arm, Patient Position: Sitting, Cuff Size: Adult)   Pulse 50   Temp 97.2 °F (36.2 °C) (Oral)   Resp 16   Ht 170.2 cm (67.01\")   Wt 66 kg (145 lb 9.6 oz)   SpO2 98%   BMI 22.80 kg/m²   Estimated body mass index is 22.8 kg/m² as calculated from the following:    Height as of this encounter: 170.2 cm (67.01\").    Weight as of this encounter: 66 kg (145 lb 9.6 oz).    BMI is within normal parameters. No other follow-up for BMI required.       Review of Systems   Physical Exam  Constitutional:       Appearance: She is normal weight.   HENT:      Head: Normocephalic and atraumatic.   Cardiovascular:      Rate and Rhythm: Normal rate and regular rhythm.      Pulses: Normal pulses.      Heart sounds: Normal heart sounds.   Pulmonary:      Effort: Pulmonary effort is normal.      Breath sounds: Normal breath sounds.   Abdominal:      General: Abdomen is flat. Bowel sounds are normal.      Palpations: Abdomen is soft.   Neurological:      Mental Status: She is alert and oriented to person, place, and time.   Psychiatric:         Mood and Affect: Mood normal.         Behavior: Behavior normal.         Thought Content: Thought content normal.         Judgment: Judgment normal.        Result Review :    Common labs  "         12/30/2024    08:57   Common Labs   Glucose 92    BUN 9    Creatinine 0.60    Sodium 136    Potassium 4.2    Chloride 103    Calcium 9.2    Albumin 3.9    Total Bilirubin 0.7    Alkaline Phosphatase 81    AST (SGOT) 15    ALT (SGPT) 11    WBC 6.7    Hemoglobin 12.6    Hematocrit 38.4    Platelets 278    Total Cholesterol 139    Triglycerides 83    HDL Cholesterol 48    LDL Cholesterol  75    Hemoglobin A1C 5.2      CMP          12/30/2024    08:57   CMP   Glucose 92    BUN 9    Creatinine 0.60    Sodium 136    Potassium 4.2    Chloride 103    Calcium 9.2    Albumin 3.9    Total Bilirubin 0.7    Alkaline Phosphatase 81    AST (SGOT) 15    ALT (SGPT) 11    BUN/Creatinine Ratio 15      CBC          12/30/2024    08:57   CBC   WBC 6.7    RBC 4.12    Hemoglobin 12.6    Hematocrit 38.4    MCV 93    MCH 30.6    MCHC 32.8    RDW 12.1    Platelets 278      CBC w/diff          12/30/2024    08:57   CBC w/Diff   WBC 6.7    RBC 4.12    Hemoglobin 12.6    Hematocrit 38.4    MCV 93    MCH 30.6    MCHC 32.8    RDW 12.1    Platelets 278    Neutrophil Rel % 71    Lymphocyte Rel % 15    Monocyte Rel % 10    Eosinophil Rel % 3    Basophil Rel % 1      Lipid Panel          12/30/2024    08:57   Lipid Panel   Total Cholesterol 139    Triglycerides 83    HDL Cholesterol 48    VLDL Cholesterol 16    LDL Cholesterol  75        Electrolytes          12/30/2024    08:57   Electrolytes   Sodium 136    Potassium 4.2    Chloride 103    Calcium 9.2      BMP          12/30/2024    08:57   BMP   BUN 9    Creatinine 0.60    Sodium 136    Potassium 4.2    Chloride 103    CO2 21    Calcium 9.2              Results  Laboratory Studies  Cologuard test was positive. Cholesterol levels were normal. Basic metabolic panel, kidney function, electrolytes, CBC, liver function, and A1c were all normal.              Assessment and Plan   Diagnoses and all orders for this visit:    1. Positive colorectal cancer screening using Cologuard test (Primary)  -      Ambulatory Referral to Gastroenterology        Assessment & Plan  1. Positive Cologuard test.  The patient was informed that the Cologuard test returned positive, indicating the need for a colonoscopy. The risks associated with colonoscopy, including anesthesia and perforation, were discussed, although these risks are very low for her. A referral for a colonoscopy has been initiated, and she will be contacted within 14 days to schedule the procedure.    2. Lab results review.  Her lab results from December 2024 were reviewed and found to be within normal limits. Cholesterol levels, basic metabolic panel, kidney function, electrolytes, CBC, liver function, and A1c were all normal.    Follow-up  The patient will follow up in December 2025 for her annual wellness visit.           Follow Up   No follow-ups on file.  Patient was given instructions and counseling regarding her condition or for health maintenance advice. Please see specific information pulled into the AVS if appropriate.         Patient or patient representative verbalized consent for the use of Ambient Listening during the visit with  SHERI Beebe for chart documentation. 2/1/2025  17:10 EST

## 2025-02-11 ENCOUNTER — OFFICE VISIT (OUTPATIENT)
Dept: SURGERY | Facility: CLINIC | Age: 55
End: 2025-02-11
Payer: COMMERCIAL

## 2025-02-11 ENCOUNTER — PREP FOR SURGERY (OUTPATIENT)
Dept: OTHER | Facility: HOSPITAL | Age: 55
End: 2025-02-11
Payer: COMMERCIAL

## 2025-02-11 VITALS
SYSTOLIC BLOOD PRESSURE: 110 MMHG | HEART RATE: 68 BPM | WEIGHT: 143 LBS | BODY MASS INDEX: 22.44 KG/M2 | OXYGEN SATURATION: 98 % | HEIGHT: 67 IN | DIASTOLIC BLOOD PRESSURE: 50 MMHG

## 2025-02-11 DIAGNOSIS — R19.5 POSITIVE COLORECTAL CANCER SCREENING USING DNA-BASED STOOL TEST: Primary | ICD-10-CM

## 2025-02-11 PROCEDURE — 1160F RVW MEDS BY RX/DR IN RCRD: CPT | Performed by: PHYSICIAN ASSISTANT

## 2025-02-11 PROCEDURE — 1159F MED LIST DOCD IN RCRD: CPT | Performed by: PHYSICIAN ASSISTANT

## 2025-02-11 PROCEDURE — 99203 OFFICE O/P NEW LOW 30 MIN: CPT | Performed by: PHYSICIAN ASSISTANT

## 2025-02-12 NOTE — H&P (VIEW-ONLY)
ASSESSMENT/PLAN:    This is a 54-year-old lady presenting to the office today with a positive Cologuard history.  She is asymptomatic.  With this recent positive test I am recommending proceeding with a colonoscopy for further diagnostic purposes.  Risks and rationale associated with it were discussed with her with risks including but not limited to bleeding, infection, bowel perforation and the need for additional procedures.  Any additional follow-up will be discussed with her once the results have been reviewed.  All questions were answered and she was willing to proceed with all recommendations.    CC:    Positive Cologuard test    HPI:    This is a 54-year-old lady presenting to the office today at the request of SHERI Beebe for a recent positive Cologuard test.  She is asymptomatic, denying abdominal pain, distention, changes to their bowel habits, no dark tarry stools, no bright red blood with her bowel movements, no unexpected weight loss or any other complaints.    ENDOSCOPY:   Colonoscopy: None    LABS:    Cologuard: Positive    SOCIAL HISTORY:   Denies tobacco use  Denies alcohol use    FAMILY HISTORY:    Colorectal cancer: None    PREVIOUS ABDOMINAL SURGERY    Tubal abdominal ligation 2000    OTHER SURGERY  Past Surgical History:   Procedure Laterality Date    DILATATION AND CURETTAGE      MANDIBLE FRACTURE SURGERY      wired as a child (right)    TUBAL ABDOMINAL LIGATION      2000       PAST MEDICAL HISTORY:    Past Medical History:   Diagnosis Date    Chest pain     Depression     Hypotension     Lower back pain     Lumbar strain     Neck sprain     Pancreatitis     Throat pain        MEDICATIONS:     Current Outpatient Medications:     Cholecalciferol (Vitamin D3) 50 MCG (2000 UT) tablet, Take 1 tablet by mouth Daily., Disp: 90 tablet, Rfl: 3    indomethacin (INDOCIN) 50 MG capsule, Take 1 capsule by mouth 3 (Three) Times a Day As Needed for Mild Pain., Disp: 10 capsule, Rfl: 0    ALLERGIES:  "  Allergies   Allergen Reactions    No Known Drug Allergy        PHYSICAL EXAM:   Constitutional: Well-developed well-nourished, no acute distress  Vital signs:   Height 67\"  Weight 143  BMI 22.4  Neck: Supple, no palpable mass, trachea midline  Respiratory: Clear to auscultation, normal inspiratory effort  Cardiovascular: Regular rate, no murmur, no carotid bruit  Gastrointestinal: Soft, nontender  Psychiatric: Alert and oriented ×3, normal affect   BMI is within normal parameters. No other follow-up for BMI required.        Neil Bridges PA-C    [facility] - General Surgery  [address], [address]  [place] [address]    [address], [address]  [place] [address]    Office: [phone]  Fax: [phone]     "

## 2025-02-12 NOTE — PROGRESS NOTES
ASSESSMENT/PLAN:    This is a 54-year-old lady presenting to the office today with a positive Cologuard history.  She is asymptomatic.  With this recent positive test I am recommending proceeding with a colonoscopy for further diagnostic purposes.  Risks and rationale associated with it were discussed with her with risks including but not limited to bleeding, infection, bowel perforation and the need for additional procedures.  Any additional follow-up will be discussed with her once the results have been reviewed.  All questions were answered and she was willing to proceed with all recommendations.    CC:    Positive Cologuard test    HPI:    This is a 54-year-old lady presenting to the office today at the request of SHERI Beebe for a recent positive Cologuard test.  She is asymptomatic, denying abdominal pain, distention, changes to their bowel habits, no dark tarry stools, no bright red blood with her bowel movements, no unexpected weight loss or any other complaints.    ENDOSCOPY:   Colonoscopy: None    LABS:    Cologuard: Positive    SOCIAL HISTORY:   Denies tobacco use  Denies alcohol use    FAMILY HISTORY:    Colorectal cancer: None    PREVIOUS ABDOMINAL SURGERY    Tubal abdominal ligation 2000    OTHER SURGERY  Past Surgical History:   Procedure Laterality Date    DILATATION AND CURETTAGE      MANDIBLE FRACTURE SURGERY      wired as a child (right)    TUBAL ABDOMINAL LIGATION      2000       PAST MEDICAL HISTORY:    Past Medical History:   Diagnosis Date    Chest pain     Depression     Hypotension     Lower back pain     Lumbar strain     Neck sprain     Pancreatitis     Throat pain        MEDICATIONS:     Current Outpatient Medications:     Cholecalciferol (Vitamin D3) 50 MCG (2000 UT) tablet, Take 1 tablet by mouth Daily., Disp: 90 tablet, Rfl: 3    indomethacin (INDOCIN) 50 MG capsule, Take 1 capsule by mouth 3 (Three) Times a Day As Needed for Mild Pain., Disp: 10 capsule, Rfl: 0    ALLERGIES:  "  Allergies   Allergen Reactions    No Known Drug Allergy        PHYSICAL EXAM:   Constitutional: Well-developed well-nourished, no acute distress  Vital signs:   Height 67\"  Weight 143  BMI 22.4  Neck: Supple, no palpable mass, trachea midline  Respiratory: Clear to auscultation, normal inspiratory effort  Cardiovascular: Regular rate, no murmur, no carotid bruit  Gastrointestinal: Soft, nontender  Psychiatric: Alert and oriented ×3, normal affect   BMI is within normal parameters. No other follow-up for BMI required.        Niel Bridges PA-C    [facility] - General Surgery  [address], [address]  [place] [address]    [address], [address]  [place] [address]    Office: [phone]  Fax: [phone]     "

## 2025-02-26 ENCOUNTER — ANESTHESIA EVENT (OUTPATIENT)
Dept: GASTROENTEROLOGY | Facility: HOSPITAL | Age: 55
End: 2025-02-26
Payer: COMMERCIAL

## 2025-02-26 ENCOUNTER — ANESTHESIA (OUTPATIENT)
Dept: GASTROENTEROLOGY | Facility: HOSPITAL | Age: 55
End: 2025-02-26
Payer: COMMERCIAL

## 2025-02-26 ENCOUNTER — HOSPITAL ENCOUNTER (OUTPATIENT)
Facility: HOSPITAL | Age: 55
Setting detail: HOSPITAL OUTPATIENT SURGERY
Discharge: HOME | End: 2025-02-26
Attending: SURGERY | Admitting: SURGERY
Payer: COMMERCIAL

## 2025-02-26 VITALS
OXYGEN SATURATION: 100 % | DIASTOLIC BLOOD PRESSURE: 78 MMHG | SYSTOLIC BLOOD PRESSURE: 138 MMHG | RESPIRATION RATE: 16 BRPM | HEART RATE: 47 BPM

## 2025-02-26 LAB
B-HCG UR QL: NEGATIVE
EXPIRATION DATE: NORMAL
INTERNAL NEGATIVE CONTROL: NEGATIVE
INTERNAL POSITIVE CONTROL: POSITIVE
Lab: NORMAL

## 2025-02-26 PROCEDURE — 45378 DIAGNOSTIC COLONOSCOPY: CPT | Performed by: SURGERY

## 2025-02-26 PROCEDURE — 25010000002 LIDOCAINE 2% SOLUTION: Performed by: REGISTERED NURSE

## 2025-02-26 PROCEDURE — 25010000002 PROPOFOL 10 MG/ML EMULSION: Performed by: REGISTERED NURSE

## 2025-02-26 PROCEDURE — 81025 URINE PREGNANCY TEST: CPT | Performed by: SURGERY

## 2025-02-26 PROCEDURE — 25810000003 LACTATED RINGERS PER 1000 ML: Performed by: SURGERY

## 2025-02-26 RX ORDER — NALOXONE HCL 0.4 MG/ML
0.2 VIAL (ML) INJECTION AS NEEDED
Status: DISCONTINUED | OUTPATIENT
Start: 2025-02-26 | End: 2025-02-26 | Stop reason: HOSPADM

## 2025-02-26 RX ORDER — HYDROCODONE BITARTRATE AND ACETAMINOPHEN 5; 325 MG/1; MG/1
1 TABLET ORAL ONCE AS NEEDED
Status: DISCONTINUED | OUTPATIENT
Start: 2025-02-26 | End: 2025-02-26 | Stop reason: HOSPADM

## 2025-02-26 RX ORDER — PROPOFOL 10 MG/ML
VIAL (ML) INTRAVENOUS AS NEEDED
Status: DISCONTINUED | OUTPATIENT
Start: 2025-02-26 | End: 2025-02-26 | Stop reason: SURG

## 2025-02-26 RX ORDER — LIDOCAINE HYDROCHLORIDE 20 MG/ML
INJECTION, SOLUTION INFILTRATION; PERINEURAL AS NEEDED
Status: DISCONTINUED | OUTPATIENT
Start: 2025-02-26 | End: 2025-02-26 | Stop reason: SURG

## 2025-02-26 RX ORDER — ONDANSETRON 2 MG/ML
4 INJECTION INTRAMUSCULAR; INTRAVENOUS ONCE AS NEEDED
Status: DISCONTINUED | OUTPATIENT
Start: 2025-02-26 | End: 2025-02-26 | Stop reason: HOSPADM

## 2025-02-26 RX ORDER — FLUMAZENIL 0.1 MG/ML
0.2 INJECTION INTRAVENOUS AS NEEDED
Status: DISCONTINUED | OUTPATIENT
Start: 2025-02-26 | End: 2025-02-26 | Stop reason: HOSPADM

## 2025-02-26 RX ORDER — EPHEDRINE SULFATE 50 MG/ML
5 INJECTION, SOLUTION INTRAVENOUS ONCE AS NEEDED
Status: DISCONTINUED | OUTPATIENT
Start: 2025-02-26 | End: 2025-02-26 | Stop reason: HOSPADM

## 2025-02-26 RX ORDER — PROMETHAZINE HYDROCHLORIDE 25 MG/1
25 TABLET ORAL ONCE AS NEEDED
Status: DISCONTINUED | OUTPATIENT
Start: 2025-02-26 | End: 2025-02-26 | Stop reason: HOSPADM

## 2025-02-26 RX ORDER — ATROPINE SULFATE 0.4 MG/ML
0.4 INJECTION, SOLUTION INTRAMUSCULAR; INTRAVENOUS; SUBCUTANEOUS ONCE AS NEEDED
Status: DISCONTINUED | OUTPATIENT
Start: 2025-02-26 | End: 2025-02-26 | Stop reason: HOSPADM

## 2025-02-26 RX ORDER — SODIUM CHLORIDE, SODIUM LACTATE, POTASSIUM CHLORIDE, CALCIUM CHLORIDE 600; 310; 30; 20 MG/100ML; MG/100ML; MG/100ML; MG/100ML
30 INJECTION, SOLUTION INTRAVENOUS CONTINUOUS PRN
Status: DISCONTINUED | OUTPATIENT
Start: 2025-02-26 | End: 2025-02-26 | Stop reason: HOSPADM

## 2025-02-26 RX ORDER — PROMETHAZINE HYDROCHLORIDE 25 MG/1
25 SUPPOSITORY RECTAL ONCE AS NEEDED
Status: DISCONTINUED | OUTPATIENT
Start: 2025-02-26 | End: 2025-02-26 | Stop reason: HOSPADM

## 2025-02-26 RX ORDER — HYDROMORPHONE HYDROCHLORIDE 1 MG/ML
0.5 INJECTION, SOLUTION INTRAMUSCULAR; INTRAVENOUS; SUBCUTANEOUS
Status: DISCONTINUED | OUTPATIENT
Start: 2025-02-26 | End: 2025-02-26 | Stop reason: HOSPADM

## 2025-02-26 RX ORDER — HYDRALAZINE HYDROCHLORIDE 20 MG/ML
5 INJECTION INTRAMUSCULAR; INTRAVENOUS
Status: DISCONTINUED | OUTPATIENT
Start: 2025-02-26 | End: 2025-02-26 | Stop reason: HOSPADM

## 2025-02-26 RX ORDER — LABETALOL HYDROCHLORIDE 5 MG/ML
5 INJECTION, SOLUTION INTRAVENOUS
Status: DISCONTINUED | OUTPATIENT
Start: 2025-02-26 | End: 2025-02-26 | Stop reason: HOSPADM

## 2025-02-26 RX ORDER — FENTANYL CITRATE 50 UG/ML
50 INJECTION, SOLUTION INTRAMUSCULAR; INTRAVENOUS
Status: DISCONTINUED | OUTPATIENT
Start: 2025-02-26 | End: 2025-02-26 | Stop reason: HOSPADM

## 2025-02-26 RX ORDER — DIPHENHYDRAMINE HYDROCHLORIDE 50 MG/ML
12.5 INJECTION INTRAMUSCULAR; INTRAVENOUS
Status: DISCONTINUED | OUTPATIENT
Start: 2025-02-26 | End: 2025-02-26 | Stop reason: HOSPADM

## 2025-02-26 RX ORDER — IPRATROPIUM BROMIDE AND ALBUTEROL SULFATE 2.5; .5 MG/3ML; MG/3ML
3 SOLUTION RESPIRATORY (INHALATION) ONCE AS NEEDED
Status: DISCONTINUED | OUTPATIENT
Start: 2025-02-26 | End: 2025-02-26 | Stop reason: HOSPADM

## 2025-02-26 RX ORDER — OXYCODONE AND ACETAMINOPHEN 7.5; 325 MG/1; MG/1
1 TABLET ORAL EVERY 4 HOURS PRN
Status: DISCONTINUED | OUTPATIENT
Start: 2025-02-26 | End: 2025-02-26 | Stop reason: HOSPADM

## 2025-02-26 RX ADMIN — PROPOFOL 50 MG: 10 INJECTION, EMULSION INTRAVENOUS at 12:52

## 2025-02-26 RX ADMIN — LIDOCAINE HYDROCHLORIDE 60 MG: 20 INJECTION, SOLUTION INFILTRATION; PERINEURAL at 12:44

## 2025-02-26 RX ADMIN — PROPOFOL 100 MG: 10 INJECTION, EMULSION INTRAVENOUS at 12:44

## 2025-02-26 RX ADMIN — SODIUM CHLORIDE, POTASSIUM CHLORIDE, SODIUM LACTATE AND CALCIUM CHLORIDE 30 ML/HR: 600; 310; 30; 20 INJECTION, SOLUTION INTRAVENOUS at 12:14

## 2025-02-26 RX ADMIN — PROPOFOL 160 MCG/KG/MIN: 10 INJECTION, EMULSION INTRAVENOUS at 12:45

## 2025-02-26 NOTE — ANESTHESIA PREPROCEDURE EVALUATION
Anesthesia Evaluation     Patient summary reviewed and Nursing notes reviewed                Airway   Mallampati: II  Dental    (+) upper dentures    Pulmonary    (+) a smoker Former,  Cardiovascular - negative cardio ROS    ECG reviewed  Rhythm: regular  Rate: normal        Neuro/Psych  (+) psychiatric history Depression  GI/Hepatic/Renal/Endo - negative ROS     Musculoskeletal (-) negative ROS    Abdominal    Substance History - negative use     OB/GYN negative ob/gyn ROS         Other                      Anesthesia Plan    ASA 2     MAC     intravenous induction     Anesthetic plan, risks, benefits, and alternatives have been provided, discussed and informed consent has been obtained with: patient.      CODE STATUS:

## 2025-02-26 NOTE — ANESTHESIA POSTPROCEDURE EVALUATION
Patient: Mahnaz Topete    Procedure Summary       Date: 02/26/25 Room / Location:  [facility]    Anesthesia Start: 1239 Anesthesia Stop:     Procedure: COLONOSCOPY INTO CECUM Diagnosis:       Positive colorectal cancer screening using DNA-based stool test      (Positive colorectal cancer screening using DNA-based stool test [R19.5])    Surgeons: Eric Mcduffie MD Provider: Frantz Fox MD    Anesthesia Type: MAC ASA Status: 2            Anesthesia Type: MAC    Vitals  No vitals data found for the desired time range.          Post Anesthesia Care and Evaluation    Patient location during evaluation: PACU  Patient participation: complete - patient participated  Level of consciousness: awake and alert  Pain management: adequate    Airway patency: patent  Anesthetic complications: No anesthetic complications    Cardiovascular status: acceptable  Respiratory status: acceptable  Hydration status: acceptable    Comments: --------------------            02/26/25               1211     --------------------   BP:       115/57     Pulse:      55       Resp:       13       SpO2:      97%      --------------------

## 2025-02-26 NOTE — DISCHARGE INSTRUCTIONS

## 2025-02-26 NOTE — OP NOTE
PREOPERATIVE DIAGNOSIS:  Positive Cologuard    POSTOPERATIVE DIAGNOSIS AND FINDINGS:  Normal    PROCEDURE:  Colonoscopy to terminal ileum    SURGEON:  Eric Mcduffie MD    ANESTHESIA:  MAC    SPECIMEN(S):  none    DESCRIPTION:  In decubitus position digital rectal exam was normal. Colonoscope inserted under direct visualization of lumen to cecum confirmed by visualization of ileocecal valve and appendiceal orifice.  Ileocecal valve was intubated and terminal ileum was normal.  Scope was slowly withdrawn circumferentially examining all mucosal surfaces.  Bowel preparation was good.  There were no mucosal abnormalities.  Tolerated well.    RECOMMENDATION FOR FUTURE SURVEILLANCE:  10 years    Eric Mcduffie M.D.

## 2025-02-26 NOTE — ANESTHESIA POSTPROCEDURE EVALUATION
Patient: Mahnaz Topete    Procedure Summary       Date: 02/26/25 Room / Location:  [facility]    Anesthesia Start: 1239 Anesthesia Stop: 1306    Procedure: COLONOSCOPY INTO CECUM Diagnosis:       Positive colorectal cancer screening using DNA-based stool test      (Positive colorectal cancer screening using DNA-based stool test [R19.5])    Surgeons: Eric Mcduffie MD Provider: Frantz Fox MD    Anesthesia Type: MAC ASA Status: 2            Anesthesia Type: MAC    Vitals  Vitals Value Taken Time   /78 02/26/25 1323   Temp     Pulse 48 02/26/25 1325   Resp 16 02/26/25 1323   SpO2 99 % 02/26/25 1325   Vitals shown include unfiled device data.        Post Anesthesia Care and Evaluation    Patient location during evaluation: PACU  Patient participation: complete - patient participated  Level of consciousness: awake and alert  Pain management: adequate    Airway patency: patent  Anesthetic complications: No anesthetic complications    Cardiovascular status: acceptable  Respiratory status: acceptable  Hydration status: acceptable    Comments: --------------------            02/26/25               1323     --------------------   BP:       138/78     Pulse:    (!) 47     Resp:       16       SpO2:      100%     --------------------

## 2025-05-21 DIAGNOSIS — B35.1 ONYCHOMYCOSIS: ICD-10-CM

## 2025-05-21 RX ORDER — TERBINAFINE HYDROCHLORIDE 250 MG/1
250 TABLET ORAL DAILY
Qty: 30 TABLET | OUTPATIENT
Start: 2025-05-21

## 2025-06-03 ENCOUNTER — OFFICE VISIT (OUTPATIENT)
Age: 55
End: 2025-06-03
Payer: COMMERCIAL

## 2025-06-03 VITALS
TEMPERATURE: 97 F | BODY MASS INDEX: 22.44 KG/M2 | SYSTOLIC BLOOD PRESSURE: 120 MMHG | DIASTOLIC BLOOD PRESSURE: 70 MMHG | HEART RATE: 61 BPM | HEIGHT: 67 IN | OXYGEN SATURATION: 100 % | RESPIRATION RATE: 14 BRPM | WEIGHT: 143 LBS

## 2025-06-03 DIAGNOSIS — B35.1 ONYCHOMYCOSIS: Primary | ICD-10-CM

## 2025-06-03 DIAGNOSIS — B37.31 CANDIDA VAGINITIS: ICD-10-CM

## 2025-06-03 PROCEDURE — 1126F AMNT PAIN NOTED NONE PRSNT: CPT | Performed by: FAMILY MEDICINE

## 2025-06-03 PROCEDURE — 99214 OFFICE O/P EST MOD 30 MIN: CPT | Performed by: FAMILY MEDICINE

## 2025-06-03 RX ORDER — TERBINAFINE HYDROCHLORIDE 250 MG/1
250 TABLET ORAL DAILY
Qty: 30 TABLET | Refills: 0 | Status: SHIPPED | OUTPATIENT
Start: 2025-06-03

## 2025-06-03 RX ORDER — FLUCONAZOLE 150 MG/1
150 TABLET ORAL ONCE
Qty: 1 TABLET | Refills: 5 | Status: SHIPPED | OUTPATIENT
Start: 2025-06-03 | End: 2025-06-03

## 2025-06-03 NOTE — PROGRESS NOTES
"Chief Complaint  Nail Problem    Subjective        Mahnaz Topete presents to [facility]  History of Present Illness    History of Present Illness  The patient presents for evaluation of a yeast infection.    She was previously prescribed an 84-day course of medication, of which she has completed 60 days. Upon attempting to refill her prescription, she was informed that a consultation with her physician was necessary. She reports developing a yeast infection whenever she is on any medication regimen. She recalls a similar occurrence following a sinus infection a few years ago, where the treatment resulted in a yeast infection. She mentions that her feet become excessively sweaty and sore when she wears socks during work. She is seeking a refill of her fluconazole prescription, noting that taking fluconazole has helped alleviate her symptoms.       Objective   Vital Signs:  /70 (BP Location: Left arm, Patient Position: Sitting, Cuff Size: Adult)   Pulse 61   Temp 97 °F (36.1 °C) (Temporal)   Resp 14   Ht 170 cm (66.93\")   Wt 64.9 kg (143 lb)   SpO2 100%   BMI 22.44 kg/m²   Estimated body mass index is 22.44 kg/m² as calculated from the following:    Height as of this encounter: 170 cm (66.93\").    Weight as of this encounter: 64.9 kg (143 lb).    BMI is within normal parameters. No other follow-up for BMI required.       Physical Exam  Constitutional:       General: She is not in acute distress.     Appearance: Normal appearance. She is not ill-appearing, toxic-appearing or diaphoretic.   HENT:      Head: Normocephalic and atraumatic.      Right Ear: There is no impacted cerumen.      Left Ear: There is no impacted cerumen.      Nose: No congestion or rhinorrhea.      Mouth/Throat:      Pharynx: Oropharynx is clear. No oropharyngeal exudate or posterior oropharyngeal erythema.   Eyes:      General: No scleral icterus.        Right eye: No discharge.         Left eye: No " discharge.      Extraocular Movements: Extraocular movements intact.      Conjunctiva/sclera: Conjunctivae normal.      Pupils: Pupils are equal, round, and reactive to light.   Cardiovascular:      Rate and Rhythm: Normal rate and regular rhythm.      Pulses: Normal pulses.      Heart sounds: Normal heart sounds.   Pulmonary:      Effort: Pulmonary effort is normal.      Breath sounds: Normal breath sounds.   Abdominal:      General: Abdomen is flat. Bowel sounds are normal.      Palpations: Abdomen is soft.   Musculoskeletal:         General: Normal range of motion.      Cervical back: Normal range of motion and neck supple.   Skin:     General: Skin is warm.   Neurological:      General: No focal deficit present.      Mental Status: She is alert and oriented to person, place, and time. Mental status is at baseline.   Psychiatric:         Mood and Affect: Mood normal.         Behavior: Behavior normal.         Thought Content: Thought content normal.         Judgment: Judgment normal.             Cardiovascular: Blood pressure is normal.       Result Review :    Results                Assessment and Plan   Diagnoses and all orders for this visit:    1. Onychomycosis (Primary)  -     terbinafine (lamiSIL) 250 MG tablet; Take 1 tablet by mouth Daily.  Dispense: 30 tablet; Refill: 0    2. Candida vaginitis  -     fluconazole (DIFLUCAN) 150 MG tablet; Take 1 tablet by mouth 1 (One) Time for 1 dose.  Dispense: 1 tablet; Refill: 5        Assessment & Plan  1. Yeast infection.  - Reports recurrent yeast infections associated with taking various medications.  - Symptoms resolved with fluconazole, including an old prescription that was three months past expiration.  - Discussed the unusual nature of fluconazole being effective for her symptoms, which are typically fungal in origin.  - Prescription for an additional 30 tablets of fluconazole has been provided.          Follow Up   No follow-ups on file.  Patient was given  instructions and counseling regarding her condition or for health maintenance advice. Please see specific information pulled into the AVS if appropriate.         Patient or patient representative verbalized consent for the use of Ambient Listening during the visit with  Ty Estrada MD for chart documentation. 6/3/2025  09:49 EDT

## (undated) DEVICE — CANN O2 ETCO2 FITS ALL CONN CO2 SMPL A/ 7IN DISP LF

## (undated) DEVICE — TUBING, SUCTION, 1/4" X 10', STRAIGHT: Brand: MEDLINE

## (undated) DEVICE — SENSR O2 OXIMAX FNGR A/ 18IN NONSTR

## (undated) DEVICE — ADAPT CLN BIOGUARD AIR/H2O DISP

## (undated) DEVICE — LN SMPL CO2 SHTRM SD STREAM W/M LUER

## (undated) DEVICE — KT ORCA ORCAPOD DISP STRL